# Patient Record
Sex: FEMALE | Race: WHITE | NOT HISPANIC OR LATINO | URBAN - METROPOLITAN AREA
[De-identification: names, ages, dates, MRNs, and addresses within clinical notes are randomized per-mention and may not be internally consistent; named-entity substitution may affect disease eponyms.]

---

## 2024-11-06 ENCOUNTER — OFFICE VISIT (OUTPATIENT)
Dept: URGENT CARE | Age: 34
End: 2024-11-06
Payer: COMMERCIAL

## 2024-11-06 VITALS
WEIGHT: 180 LBS | OXYGEN SATURATION: 98 % | TEMPERATURE: 98.6 F | RESPIRATION RATE: 16 BRPM | HEART RATE: 102 BPM | DIASTOLIC BLOOD PRESSURE: 76 MMHG | SYSTOLIC BLOOD PRESSURE: 117 MMHG

## 2024-11-06 DIAGNOSIS — J02.9 SORE THROAT: ICD-10-CM

## 2024-11-06 DIAGNOSIS — R05.9 COUGH, UNSPECIFIED TYPE: Primary | ICD-10-CM

## 2024-11-06 LAB
POC RAPID INFLUENZA A: NEGATIVE
POC RAPID INFLUENZA B: NEGATIVE
POC RAPID MONO: NEGATIVE
POC RAPID STREP: NEGATIVE
POC SARS-COV-2 AG BINAX: NORMAL

## 2024-11-06 RX ORDER — AZITHROMYCIN 250 MG/1
TABLET, FILM COATED ORAL
Qty: 6 TABLET | Refills: 0 | Status: SHIPPED | OUTPATIENT
Start: 2024-11-06 | End: 2024-11-11

## 2024-11-06 ASSESSMENT — ENCOUNTER SYMPTOMS: SORE THROAT: 1

## 2024-11-06 NOTE — PROGRESS NOTES
Subjective   Patient ID: Deborah Mchugh is a 34 y.o. female. They present today with a chief complaint of Sore Throat, Cough, and Generalized Body Aches (For 3 days).    History of Present Illness  Patient reports sore throat, cough, and body aches 3 days.  Denies chest pain or shortness of breath.            Past Medical History  Allergies as of 11/06/2024 - never reviewed   Allergen Reaction Noted    Penicillins Unknown 11/06/2024       (Not in a hospital admission)       Past Medical History:   Diagnosis Date    Tachycardia, unspecified     Tachycardia       Past Surgical History:   Procedure Laterality Date    MOUTH SURGERY  07/07/2014    Oral Surgery Tooth Extraction            Review of Systems  Review of Systems   HENT:  Positive for congestion and sore throat.    All other systems reviewed and are negative.                                 Objective    Vitals:    11/06/24 0930   BP: 117/76   BP Location: Right arm   Patient Position: Sitting   BP Cuff Size: Adult   Pulse: 102   Resp: 16   Temp: 37 °C (98.6 °F)   TempSrc: Oral   SpO2: 98%   Weight: 81.6 kg (180 lb)     No LMP recorded.    Physical Exam  Vitals reviewed.   Constitutional:       Appearance: Normal appearance.   HENT:      Head: Normocephalic and atraumatic.      Right Ear: Tympanic membrane, ear canal and external ear normal.      Left Ear: Tympanic membrane, ear canal and external ear normal.      Nose: Congestion present.      Mouth/Throat:      Mouth: Mucous membranes are moist.      Pharynx: Oropharyngeal exudate and posterior oropharyngeal erythema present.   Eyes:      Extraocular Movements: Extraocular movements intact.      Conjunctiva/sclera: Conjunctivae normal.      Pupils: Pupils are equal, round, and reactive to light.   Cardiovascular:      Rate and Rhythm: Normal rate and regular rhythm.      Pulses: Normal pulses.      Heart sounds: Normal heart sounds.   Pulmonary:      Effort: Pulmonary effort is normal.      Breath sounds:  Normal breath sounds.   Musculoskeletal:         General: Normal range of motion.      Cervical back: Normal range of motion.   Skin:     General: Skin is warm.      Capillary Refill: Capillary refill takes less than 2 seconds.   Neurological:      General: No focal deficit present.      Mental Status: She is alert and oriented to person, place, and time.   Psychiatric:         Mood and Affect: Mood normal.         Behavior: Behavior normal.         Procedures    Point of Care Test & Imaging Results from this visit  No results found for this visit on 11/06/24.   No results found.    Diagnostic study results (if any) were reviewed by FITZ Michael.    Assessment/Plan   Allergies, medications, history, and pertinent labs/EKGs/Imaging reviewed by FITZ Michael.     Medical Decision Making    Patient in NAD, VSS, HRR, lungs clear. Sore throat 3 days, on exam throat erythema, with exudate, patent airway.    Strep, covid, and flu negative  Mono negative  Start Zpack as directed, go to ED with worsening symptoms, patient agrees with plan of care.    Orders and Diagnoses  Diagnoses and all orders for this visit:  Cough, unspecified type  -     POCT Covid-19 Rapid Antigen  -     POCT Influenza A/B manually resulted  -     POCT rapid strep A manually resulted      Medical Admin Record      Patient disposition: Home    Electronically signed by FITZ Michael  9:33 AM

## 2025-09-05 ENCOUNTER — APPOINTMENT (OUTPATIENT)
Facility: CLINIC | Age: 35
End: 2025-09-05
Payer: COMMERCIAL

## 2025-09-12 ENCOUNTER — APPOINTMENT (OUTPATIENT)
Facility: CLINIC | Age: 35
End: 2025-09-12
Payer: COMMERCIAL